# Patient Record
Sex: FEMALE | Race: WHITE | NOT HISPANIC OR LATINO | Employment: OTHER | URBAN - METROPOLITAN AREA
[De-identification: names, ages, dates, MRNs, and addresses within clinical notes are randomized per-mention and may not be internally consistent; named-entity substitution may affect disease eponyms.]

---

## 2020-03-03 ENCOUNTER — ESTABLISHED COMPREHENSIVE EXAM (OUTPATIENT)
Dept: URBAN - METROPOLITAN AREA CLINIC 42 | Facility: CLINIC | Age: 75
End: 2020-03-03

## 2020-03-03 VITALS — HEIGHT: 55 IN | SYSTOLIC BLOOD PRESSURE: 130 MMHG | DIASTOLIC BLOOD PRESSURE: 70 MMHG

## 2020-03-03 DIAGNOSIS — H04.123: ICD-10-CM

## 2020-03-03 DIAGNOSIS — H02.831: ICD-10-CM

## 2020-03-03 DIAGNOSIS — H25.13: ICD-10-CM

## 2020-03-03 DIAGNOSIS — H52.03: ICD-10-CM

## 2020-03-03 DIAGNOSIS — H52.4: ICD-10-CM

## 2020-03-03 DIAGNOSIS — H52.203: ICD-10-CM

## 2020-03-03 DIAGNOSIS — H02.834: ICD-10-CM

## 2020-03-03 DIAGNOSIS — E11.9: ICD-10-CM

## 2020-03-03 PROCEDURE — 92015 DETERMINE REFRACTIVE STATE: CPT

## 2020-03-03 PROCEDURE — 92014 COMPRE OPH EXAM EST PT 1/>: CPT

## 2020-03-03 ASSESSMENT — VISUAL ACUITY
OU_SC: 20/100
OS_SC: 20/200
OD_SC: 20/200
OU_SC: 20/80
OS_SC: 20/200
OD_SC: 20/200

## 2020-03-03 ASSESSMENT — KERATOMETRY
OS_K1POWER_DIOPTERS: 42.75
OS_AXISANGLE_DEGREES: 133
OS_K2POWER_DIOPTERS: 43
OD_AXISANGLE_DEGREES: 140
OD_K2POWER_DIOPTERS: 43
OD_K1POWER_DIOPTERS: 43
OS_AXISANGLE2_DEGREES: 43
OD_AXISANGLE2_DEGREES: 50

## 2020-03-03 ASSESSMENT — TONOMETRY
OS_IOP_MMHG: 21
OD_IOP_MMHG: 20

## 2020-10-22 NOTE — PATIENT DISCUSSION
Recommend 60 units of Botox. Patient elects Botox injection. *units discarded, * units used. (discussed risks and benefits of BOTOX. ..) see an oral surgeon pt has TMJ. 30 units per side.

## 2021-03-10 ENCOUNTER — ESTABLISHED COMPREHENSIVE EXAM (OUTPATIENT)
Dept: URBAN - METROPOLITAN AREA CLINIC 42 | Facility: CLINIC | Age: 76
End: 2021-03-10

## 2021-03-10 DIAGNOSIS — H02.834: ICD-10-CM

## 2021-03-10 DIAGNOSIS — H25.13: ICD-10-CM

## 2021-03-10 DIAGNOSIS — H52.03: ICD-10-CM

## 2021-03-10 DIAGNOSIS — H02.831: ICD-10-CM

## 2021-03-10 DIAGNOSIS — E11.9: ICD-10-CM

## 2021-03-10 DIAGNOSIS — H52.4: ICD-10-CM

## 2021-03-10 DIAGNOSIS — H52.223: ICD-10-CM

## 2021-03-10 DIAGNOSIS — H04.123: ICD-10-CM

## 2021-03-10 PROCEDURE — 92014 COMPRE OPH EXAM EST PT 1/>: CPT

## 2021-03-10 PROCEDURE — 92015 DETERMINE REFRACTIVE STATE: CPT

## 2021-03-10 ASSESSMENT — TONOMETRY
OS_IOP_MMHG: 19
OD_IOP_MMHG: 17

## 2021-03-10 ASSESSMENT — KERATOMETRY
OS_K2POWER_DIOPTERS: 43.50
OS_AXISANGLE2_DEGREES: 30
OD_AXISANGLE2_DEGREES: 50
OD_AXISANGLE_DEGREES: 102
OS_AXISANGLE_DEGREES: 133
OD_K2POWER_DIOPTERS: 43.00
OS_K1POWER_DIOPTERS: 42.75
OD_K1POWER_DIOPTERS: 42.25
OS_K2POWER_DIOPTERS: 43
OD_K1POWER_DIOPTERS: 43
OS_AXISANGLE2_DEGREES: 43
OD_AXISANGLE2_DEGREES: 12
OS_K1POWER_DIOPTERS: 43.00
OD_K2POWER_DIOPTERS: 43
OD_AXISANGLE_DEGREES: 140
OS_AXISANGLE_DEGREES: 120

## 2021-03-10 ASSESSMENT — VISUAL ACUITY
OS_SC: 20/200
OU_SC: 20/80-2
OU_SC: 20/100
OS_SC: 20/200
OD_SC: 20/100-1
OD_SC: 20/200

## 2021-12-08 NOTE — PATIENT DISCUSSION
Recommend Bilateral upper lid blepharoplasty. Medicare (discussed risks and benefits of sx. ..).  If patient elects Ember Alanis will remove fat @ N/C.

## 2022-01-13 NOTE — PATIENT DISCUSSION
Recommend Bilateral upper lid blepharoplasty. Medicare (discussed risks and benefits of sx. ..).  If patient elects Monty Owens will remove fat @ N/C.

## 2022-01-25 NOTE — PATIENT DISCUSSION
Patient informed of available non-opioid medications and other non-pharmacological options. Discussed advantages and disadvantages of the alternatives, including whether the patient is at-risk for, or has a history of, controlled substance abuse or misuse, and the patient’s personal preferences. [facility] “Opioid Alternative Pamphlet” was provided to patient.

## 2022-01-25 NOTE — PATIENT DISCUSSION
Recommend Bilateral upper lid blepharoplasty. Medicare (discussed risks and benefits of sx. ..).  If patient elects Elizabeth Guerra will remove fat @ N/C.

## 2022-01-25 NOTE — PATIENT DISCUSSION
Recommend Bilateral upper lid blepharoplasty. Medicare (discussed risks and benefits of sx. ..).  If patient elects Teresa Soria will remove fat @ N/C.

## 2022-01-25 NOTE — PATIENT DISCUSSION
Patient informed of available non-opioid medications and other non-pharmacological options. Discussed advantages and disadvantages of the alternatives, including whether the patient is at-risk for, or has a history of, controlled substance abuse or misuse, and the patient’s personal preferences. [facility] “Opioid Alternative Pamphlet” was provided to patient.

## 2022-01-25 NOTE — PROCEDURE NOTE: SURGICAL
<p>MR #:&nbsp; [id number]<br /><br />PREOPERATIVE DIAGNOSIS:&nbsp; &nbsp; &nbsp;1. Dermatochalasis upper lids; 2. Lower lid fat herniation and excess skin<br /><br />POSTOPERATIVE DIAGNOSIS:&nbsp; &nbsp;Same<br /><br />PROCEDURE:&nbsp; &nbsp;1. Upper lid blepharoplasty both eyes; 2. C02 Laser transconjunctival blepharoplasty lower lids; 3. Re-drape fat medially with 5-0 chromic both lower lids. 4. Skin resurfacing of lower lids<br /><br />ANESTHESIA:&nbsp; &nbsp; &nbsp; Local MAC<br /><br />ESTIMATED BLOOD LOSS:&nbsp; &nbsp; Minimal, &lt;5 cc<br /><br />COMPLICATIONS:&nbsp; &nbsp; None<br /><br />INDICATION:&nbsp; This patient had complaints of heavy skin and muscle of the upper eyelids that comes down over the eyelids and affects vision. &nbsp; Examination complete with a photograph of the patient confirmed extra upper lid skin that hangs over the eye and blocks vision. Superior visual field defects were also documented on Sy visual field with marked improvement in the superior scotomas after taping the lids up. We have discussed that a standard blepharoplasty operation would remove this extra tissue from the upper lids and allow an improvement in their abnormal visual field. Patient understands the risks and benefits, including the risk of ectropion and scleral show and wishes to proceed. This patient also has large herniated fat on the lower lids and redundant skin of the lower lids. We decided to perform a lower blepharoplasty operation to remove lower lid fat bags and to lightly resurface the skin to tighten them up. Patient understands the risks and benefits of the surgery and wishes to proceed. <br /><br />PROCEDURE:&nbsp; The patient was brought to the operating room and laid in the supine position. Prior to surgery, a time-out was performed in the operating room, and the nurse confirmed the patient&rsquo;s identity and name, the side and site of the surgery and the type of surgery and procedure to be performed. &nbsp; I proceeded with the marking of the patient with a marking pen, in the areas of surgery to be performed. The patient had upper lid crease markings drawn out with a marking pen. The skin of the upper lids was then tentatively bunched with two forceps to determine the amount of skin to be removed so the patient could still close their eye and not have lagophthalmos. The superior portion of the blepharoplasty incision line was then marked in an eclipse with a marking pen. The patient was then sedated and injected with Xylocaine 2% with epinephrine. &nbsp; Approximately 6cc&rsquo;s were used for both lids. &nbsp; A drop of Alcaine was placed over both eyes. The patient was then prepped and draped in typical fashion for facial plastic surgery. Laser safe protective corneal lenses were then placed over both eyes. Two forceps were then used to recheck the upper lid measurements and the patient was remarked with a blue marking pen in a spindle shape fashion on each upper lid. A 15 blade was first used to make an incision through each pre-marked area on each upper lid. &nbsp; A Bovie cautery on cutting mode with a [place] tip was then used to remove a skin muscle flap on each upper lid. &nbsp; The orbital septum was opened and the orbital fat selectively removed with the Bovie Cautery from each upper lid. Meticulous hemostasis was achieved. After cauterizing all bleeders, each wound was then closed with running 6-0 plain sutures. &nbsp; The patient was inspected for contour and shape. An excellent appearance was noted. <br /><br />Attention was turned to the lower blepharoplasty operation where a rake was then inserted into the right lower lid, retracting it downward. A transconjunctival incision was made 2mm below the tarsus with the Bovie cautery. This incision was carried across the extent of the inner lid. Dissection was carried down with the Bovie cautery while pulling upward on the conjunctiva and retractor layer with a forceps. The fat was released from the septum and allowed to herniate forward exposing the nasal, central and temporal fat pads. Care was taken to avoid the inferior oblique muscle. These pads were teased forward and isolated. The fat was removed selectively with a Bovie cautery with excellent hemostasis. Blunt dissection was carried out over the inferior orbital rim and the tear trough ligament was released with a per-ostial elevator. Medial and central fat pads were re-draped below the muscle and over the orbital rim to camouflage the tear trough recess. A 5-0 chromic suture was used deep and externalized through the skin. &nbsp; After judging adequate fat removal and re-draping the left lower lid had similar procedure. The skin was then resurfaced using the resurfacing CO2 laser on a low setting of 20 reyes scan mode for an initial pass on both lower lids. This was done to tighten up the skin to remove wrinkles. This patient did have lid laxity so the laser was not aggressively performed on this patient in order to avoid ectropion. A second pass on the malar area was performed with the C02 laser set again at 20 reyes. This was done to tighten up the malar area so fluid bags would not collect on the cheeks of this patient. At the end of this procedure the laser lens were removed. The patient was cleaned; antibiotic salve was placed on the wounds. The patient was sent to recovery room in stable condition. <strong></strong><br /></p>

## 2022-02-01 NOTE — PATIENT DISCUSSION
Three week post op: lids in good position, no infection, healing well, DC erythromycin and Tobradex.  Wear sunglasses and hat while outdoors. Avoid sun exposure. No restrictions.

## 2022-02-01 NOTE — PATIENT DISCUSSION
Three week post op: great curve and shape, no infection, healing well, sutures intact and removed, DC erythromycin. Use Vitamin E oil/Skinuva BID x 1 month to incisions after 10 days. Wear sunglasses and hat while outdoors. Avoid sun exposure. No restrictions.

## 2022-02-25 NOTE — PATIENT DISCUSSION
Recommend Bilateral upper lid blepharoplasty. Medicare (discussed risks and benefits of sx. ..).  If patient elects Radha Cornejo will remove fat @ N/C.

## 2022-03-11 ENCOUNTER — COMPREHENSIVE EXAM (OUTPATIENT)
Dept: URBAN - METROPOLITAN AREA CLINIC 42 | Facility: CLINIC | Age: 77
End: 2022-03-11

## 2022-03-11 DIAGNOSIS — H04.123: ICD-10-CM

## 2022-03-11 DIAGNOSIS — H52.4: ICD-10-CM

## 2022-03-11 DIAGNOSIS — H25.13: ICD-10-CM

## 2022-03-11 DIAGNOSIS — H52.223: ICD-10-CM

## 2022-03-11 DIAGNOSIS — H02.834: ICD-10-CM

## 2022-03-11 DIAGNOSIS — H52.03: ICD-10-CM

## 2022-03-11 DIAGNOSIS — H02.831: ICD-10-CM

## 2022-03-11 PROCEDURE — 92015 DETERMINE REFRACTIVE STATE: CPT

## 2022-03-11 PROCEDURE — 92014 COMPRE OPH EXAM EST PT 1/>: CPT

## 2022-03-11 ASSESSMENT — KERATOMETRY
OD_K1POWER_DIOPTERS: 43
OS_K2POWER_DIOPTERS: 43.50
OD_AXISANGLE_DEGREES: 140
OS_K1POWER_DIOPTERS: 43.25
OS_AXISANGLE_DEGREES: 113
OS_AXISANGLE2_DEGREES: 43
OS_AXISANGLE_DEGREES: 120
OD_K1POWER_DIOPTERS: 42.25
OD_K2POWER_DIOPTERS: 43.25
OD_K2POWER_DIOPTERS: 43
OS_K1POWER_DIOPTERS: 42.75
OD_AXISANGLE2_DEGREES: 016
OS_AXISANGLE2_DEGREES: 30
OS_K1POWER_DIOPTERS: 43.00
OD_AXISANGLE2_DEGREES: 12
OD_AXISANGLE2_DEGREES: 50
OS_K2POWER_DIOPTERS: 43.75
OS_K2POWER_DIOPTERS: 43
OD_AXISANGLE_DEGREES: 102
OS_AXISANGLE_DEGREES: 133
OS_AXISANGLE2_DEGREES: 023
OD_K2POWER_DIOPTERS: 43.00
OD_AXISANGLE_DEGREES: 106

## 2022-03-11 ASSESSMENT — VISUAL ACUITY
OS_SC: 20/150-1
OD_SC: 20/150
OU_SC: 20/150
OS_SC: 20/200
OU_SC: 20/200
OD_SC: 20/200

## 2022-03-11 ASSESSMENT — TONOMETRY
OD_IOP_MMHG: 14
OS_IOP_MMHG: 15

## 2022-03-28 NOTE — PROCEDURE NOTE: CLINICAL
PROCEDURE NOTE: Excision of Eyelid Lesion Right Upper Lid. Diagnosis: Eyelid Lesion, Benign. Anesthesia: Topical. Prep: Betadine Scrub. Risks, benefits and alternatives discussed. Patient desires to proceed with excision of growth/lesion today. A drop of proparacaine was instilled in the involved eye. A tetracaine drop was used on a cotton tipped applicator and placed on the conjunctiva. The involved area was anesthetized using 1% lidocaine with epi. The lesion was excised using 27 gauge needle. Erythromycin ophthalmic ointment was applied. Post op instructions were given to the patient. The patient tolerated the procedure well and left in good condition. The patient understands to call us for any concerns. Della West

## 2022-03-28 NOTE — PATIENT DISCUSSION
Recommended removal of cyst like lesion using 27 gauge needle to minimize bruising. No spitting suture noted upon examination.  Written consent singed, instructed patient to apply erythromycin ointment BID until healed.

## 2023-04-14 ENCOUNTER — COMPREHENSIVE EXAM (OUTPATIENT)
Dept: URBAN - METROPOLITAN AREA CLINIC 42 | Facility: CLINIC | Age: 78
End: 2023-04-14

## 2023-04-14 DIAGNOSIS — H25.13: ICD-10-CM

## 2023-04-14 DIAGNOSIS — H04.123: ICD-10-CM

## 2023-04-14 DIAGNOSIS — H02.831: ICD-10-CM

## 2023-04-14 DIAGNOSIS — H52.223: ICD-10-CM

## 2023-04-14 DIAGNOSIS — H52.4: ICD-10-CM

## 2023-04-14 DIAGNOSIS — H52.03: ICD-10-CM

## 2023-04-14 PROCEDURE — 92014 COMPRE OPH EXAM EST PT 1/>: CPT

## 2023-04-14 PROCEDURE — 92015 DETERMINE REFRACTIVE STATE: CPT

## 2023-04-14 ASSESSMENT — KERATOMETRY
OD_AXISANGLE2_DEGREES: 99
OD_K2POWER_DIOPTERS: 42.75
OD_AXISANGLE_DEGREES: 09
OD_K2POWER_DIOPTERS: 43.00
OD_AXISANGLE2_DEGREES: 12
OS_AXISANGLE2_DEGREES: 023
OD_AXISANGLE_DEGREES: 102
OD_K1POWER_DIOPTERS: 43
OD_AXISANGLE_DEGREES: 106
OS_AXISANGLE_DEGREES: 120
OD_AXISANGLE2_DEGREES: 50
OS_K2POWER_DIOPTERS: 43.75
OD_AXISANGLE_DEGREES: 140
OS_K1POWER_DIOPTERS: 43.25
OD_K2POWER_DIOPTERS: 43.25
OS_K1POWER_DIOPTERS: 43.50
OS_AXISANGLE_DEGREES: 133
OS_K1POWER_DIOPTERS: 42.75
OS_K2POWER_DIOPTERS: 43.50
OS_K2POWER_DIOPTERS: 43.00
OS_AXISANGLE2_DEGREES: 30
OD_K1POWER_DIOPTERS: 42.25
OD_K1POWER_DIOPTERS: 43.25
OD_K2POWER_DIOPTERS: 43
OS_AXISANGLE2_DEGREES: 43
OS_AXISANGLE_DEGREES: 06
OD_AXISANGLE2_DEGREES: 016
OS_AXISANGLE_DEGREES: 113
OS_K1POWER_DIOPTERS: 43.00
OS_AXISANGLE2_DEGREES: 96
OS_K2POWER_DIOPTERS: 43

## 2023-04-14 ASSESSMENT — TONOMETRY
OD_IOP_MMHG: 14
OS_IOP_MMHG: 14

## 2024-04-22 ENCOUNTER — COMPREHENSIVE EXAM (OUTPATIENT)
Dept: URBAN - METROPOLITAN AREA CLINIC 42 | Facility: CLINIC | Age: 79
End: 2024-04-22

## 2024-04-22 DIAGNOSIS — H52.223: ICD-10-CM

## 2024-04-22 DIAGNOSIS — H02.834: ICD-10-CM

## 2024-04-22 DIAGNOSIS — H02.831: ICD-10-CM

## 2024-04-22 DIAGNOSIS — H04.123: ICD-10-CM

## 2024-04-22 DIAGNOSIS — H52.03: ICD-10-CM

## 2024-04-22 DIAGNOSIS — H25.13: ICD-10-CM

## 2024-04-22 DIAGNOSIS — H52.4: ICD-10-CM

## 2024-04-22 PROCEDURE — 92014 COMPRE OPH EXAM EST PT 1/>: CPT

## 2024-04-22 PROCEDURE — 92015 DETERMINE REFRACTIVE STATE: CPT

## 2024-04-22 ASSESSMENT — KERATOMETRY
OS_AXISANGLE2_DEGREES: 43
OD_K2POWER_DIOPTERS: 42.00
OD_AXISANGLE_DEGREES: 106
OD_AXISANGLE2_DEGREES: 94
OS_AXISANGLE2_DEGREES: 023
OS_K1POWER_DIOPTERS: 43.00
OS_K1POWER_DIOPTERS: 43.50
OD_AXISANGLE2_DEGREES: 12
OD_AXISANGLE_DEGREES: 004
OS_AXISANGLE_DEGREES: 19
OD_AXISANGLE_DEGREES: 140
OS_AXISANGLE2_DEGREES: 30
OS_K2POWER_DIOPTERS: 43.75
OD_K2POWER_DIOPTERS: 43.00
OD_K2POWER_DIOPTERS: 43
OD_K1POWER_DIOPTERS: 43
OD_AXISANGLE2_DEGREES: 99
OS_K2POWER_DIOPTERS: 43.00
OD_AXISANGLE2_DEGREES: 016
OS_AXISANGLE_DEGREES: 06
OS_K1POWER_DIOPTERS: 43.25
OS_K2POWER_DIOPTERS: 43
OD_K1POWER_DIOPTERS: 42.25
OD_AXISANGLE_DEGREES: 09
OD_K2POWER_DIOPTERS: 42.75
OS_AXISANGLE_DEGREES: 113
OS_K2POWER_DIOPTERS: 43.50
OS_AXISANGLE_DEGREES: 120
OD_K1POWER_DIOPTERS: 43.25
OD_AXISANGLE2_DEGREES: 50
OD_K1POWER_DIOPTERS: 43.00
OS_K2POWER_DIOPTERS: 42.25
OD_AXISANGLE_DEGREES: 102
OS_K1POWER_DIOPTERS: 42.75
OD_K2POWER_DIOPTERS: 43.25
OS_AXISANGLE2_DEGREES: 109
OS_AXISANGLE2_DEGREES: 96
OS_AXISANGLE_DEGREES: 133

## 2024-04-22 ASSESSMENT — TONOMETRY
OS_IOP_MMHG: 11
OD_IOP_MMHG: 14

## 2025-07-17 ENCOUNTER — COMPREHENSIVE EXAM (OUTPATIENT)
Age: 80
End: 2025-07-17

## 2025-07-17 DIAGNOSIS — H25.813: ICD-10-CM

## 2025-07-17 DIAGNOSIS — E11.3393: ICD-10-CM

## 2025-07-17 DIAGNOSIS — H02.834: ICD-10-CM

## 2025-07-17 DIAGNOSIS — H02.831: ICD-10-CM

## 2025-07-17 DIAGNOSIS — H52.03: ICD-10-CM

## 2025-07-17 DIAGNOSIS — H04.123: ICD-10-CM

## 2025-07-17 PROCEDURE — 92250E RETINAL SCREENING, ELECTIVE

## 2025-07-17 PROCEDURE — 92015 DETERMINE REFRACTIVE STATE: CPT

## 2025-07-17 PROCEDURE — 92014 COMPRE OPH EXAM EST PT 1/>: CPT
